# Patient Record
Sex: FEMALE | Race: WHITE | HISPANIC OR LATINO | Employment: UNEMPLOYED | URBAN - METROPOLITAN AREA
[De-identification: names, ages, dates, MRNs, and addresses within clinical notes are randomized per-mention and may not be internally consistent; named-entity substitution may affect disease eponyms.]

---

## 2019-06-13 ENCOUNTER — HOSPITAL ENCOUNTER (EMERGENCY)
Facility: HOSPITAL | Age: 64
Discharge: HOME | End: 2019-06-13
Attending: EMERGENCY MEDICINE

## 2019-06-13 VITALS
BODY MASS INDEX: 29.02 KG/M2 | SYSTOLIC BLOOD PRESSURE: 145 MMHG | OXYGEN SATURATION: 97 % | DIASTOLIC BLOOD PRESSURE: 87 MMHG | RESPIRATION RATE: 16 BRPM | WEIGHT: 170 LBS | TEMPERATURE: 98 F | HEART RATE: 84 BPM | HEIGHT: 64 IN

## 2019-06-13 DIAGNOSIS — L03.211 CELLULITIS OF FACE: Primary | ICD-10-CM

## 2019-06-13 DIAGNOSIS — J34.89 NASAL PAIN: ICD-10-CM

## 2019-06-13 PROCEDURE — 99283 EMERGENCY DEPT VISIT LOW MDM: CPT

## 2019-06-13 RX ORDER — SULFAMETHOXAZOLE AND TRIMETHOPRIM 800; 160 MG/1; MG/1
2 TABLET ORAL 2 TIMES DAILY
Qty: 40 TABLET | Refills: 0 | Status: SHIPPED | OUTPATIENT
Start: 2019-06-13 | End: 2019-06-23

## 2019-06-14 NOTE — ED TRIAGE NOTES
Patient states her nose began swelling about a month ago and it has continued to get larger which she decided to come to ER to be seen.

## 2019-06-14 NOTE — ED PROVIDER NOTES
"Encounter Date: 6/13/2019    SCRIBE #1 NOTE: I, Tonya aSnchez, am scribing for, and in the presence of,  Dr. Mathur . I have scribed the entire note.     I, Dr. Shoshana Mathur MD, personally performed the services described in this documentation. All medical record entries made by the scribe were at my direction and in my presence.  I have reviewed the chart and agree that the record reflects my personal performance and is accurate and complete. Shoshana Mathur MD.    History       CHIEF COMPLAINT: Patient presents with: pain to her nose    HISTORY OF PRESENT ILLNESS: Mine Jules who is a 64 y.o. presents to the emergency department today with complaint of pain to the nose. Pt reports she has had "something" swelling in her left nostril for the past month. She states that the area is painful but denies any drainage of the area. No fevers, chills or sweats. No difficulty breathing.     The history is provided by the patient. The history is limited by a language barrier. A  was used (Pts family interpret).     Review of patient's allergies indicates:  No Known Allergies  History reviewed. No pertinent past medical history.  History reviewed. No pertinent surgical history.  History reviewed. No pertinent family history.  Social History     Tobacco Use    Smoking status: Never Smoker   Substance Use Topics    Alcohol use: Not on file    Drug use: Not on file     Review of Systems   Constitutional: Negative for activity change, appetite change, chills, diaphoresis and fever.   HENT: Negative for congestion, drooling, ear pain, facial swelling, mouth sores, rhinorrhea, sinus pain, sore throat and trouble swallowing.    Eyes: Negative for pain and discharge.   Respiratory: Negative for cough, chest tightness, shortness of breath, wheezing and stridor.    Cardiovascular: Negative for chest pain, palpitations and leg swelling.   Gastrointestinal: Negative for abdominal distention, abdominal " pain, blood in stool, constipation, diarrhea, nausea and vomiting.   Genitourinary: Negative for difficulty urinating, dysuria, flank pain, frequency, hematuria and urgency.   Musculoskeletal: Negative for arthralgias, back pain and myalgias.   Skin: Negative for pallor, rash and wound.   Neurological: Negative for dizziness, syncope, weakness, light-headedness and numbness.   All other systems reviewed and are negative.      Physical Exam     Initial Vitals [06/13/19 2055]   BP Pulse Resp Temp SpO2   (!) 175/82 95 18 98.1 °F (36.7 °C) 100 %      MAP       --         Physical Exam    Nursing note and vitals reviewed.  Constitutional: She appears well-developed and well-nourished. She is not diaphoretic. No distress.   HENT:   Head: Normocephalic and atraumatic.   Right Ear: External ear normal.   Left Ear: External ear normal.   Mouth/Throat: Oropharynx is clear and moist.   Tenderness to palpation to the left lateral aspect of her nose; minimal erythema, no fluctuance, no induration. Internal nose shows no abscess.      Eyes: Conjunctivae and EOM are normal. Pupils are equal, round, and reactive to light. Right eye exhibits no discharge. Left eye exhibits no discharge. No scleral icterus.   Neck: Normal range of motion. Neck supple. No JVD present.   Cardiovascular: Normal rate, regular rhythm, normal heart sounds and intact distal pulses. Exam reveals no gallop and no friction rub.    No murmur heard.  Peripheral perfusion appropriate   Pulmonary/Chest: Breath sounds normal. No stridor. No respiratory distress. She has no wheezes. She exhibits no tenderness.   Abdominal: Soft. Bowel sounds are normal. She exhibits no distension and no mass. There is no tenderness. There is no rebound and no guarding.   Musculoskeletal: Normal range of motion. She exhibits no edema or tenderness.   Gait normal   Neurological: She is alert and oriented to person, place, and time. She has normal strength. No cranial nerve deficit.    Skin: Skin is warm and dry. Capillary refill takes less than 2 seconds. No rash noted. No pallor.   Psychiatric: She has a normal mood and affect. Thought content normal.         ED Course   Procedures            Medical Decision Making:   Differential Diagnosis:   Cellulitis, abscess, presence of foreign body  ED Management:  The patient presents with a tender area on her nose. No sign of induration at this time. No sign of abscess.  Will place on antibiotics for assumed cellulitis and have her follow up with ENT if not improving. After taking into careful account the historical factors and physical exam findings of the patient's presentation today, in conjunction with the empirical and objective data obtained on ED workup, no acute emergent medical condition requiring admission has been identified. The patient appears to be low risk for an emergent medical condition and I feel it is safe and appropriate at this time for the patient to be discharged to follow-up as detailed in their discharge instructions for reevaluation and possible continued outpatient workup and management. I have discussed the specifics of the workup with the patient and the patient has verbalized understanding of the details of the workup, the diagnosis, the treatment plan, and the need for outpatient follow-up.  Although the patient has no emergent etiology today this does not preclude the development of an emergent condition so in addition, I have advised the patient that they can return to the ED and/or activate EMS at any time with worsening of their symptoms, change of their symptoms, or with any other medical complaint.  The patient remained comfortable and stable during their visit in the ED.  Discharge and follow-up instructions discussed with the patient who expressed understanding and willingness to comply with my recommendations.     This medical record was prepared using voice dictation software. There may be phonetic  errors.                          Clinical Impression:       ICD-10-CM ICD-9-CM   1. Cellulitis of face L03.211 682.0   2. Nasal pain J34.89 478.19                                Shoshana Mathur MD  06/14/19 0402

## 2019-06-14 NOTE — DISCHARGE INSTRUCTIONS
Additional instructions   Take all your medications as prescribed. Followup with ENT if 1 week if you are not improving.Return to the emergency department if you have increasing pain, chest pain, difficulty breathing,  nonstop vomiting, or any other concerns. Be sure to drink plenty of fluids to stay hydrated. Get plenty of rest. Please refer to additional educational material for further instructions.